# Patient Record
Sex: MALE | URBAN - METROPOLITAN AREA
[De-identification: names, ages, dates, MRNs, and addresses within clinical notes are randomized per-mention and may not be internally consistent; named-entity substitution may affect disease eponyms.]

---

## 2019-03-20 ENCOUNTER — OFFICE VISIT (OUTPATIENT)
Dept: URBAN - METROPOLITAN AREA CLINIC 23 | Facility: CLINIC | Age: 79
End: 2019-03-20
Payer: COMMERCIAL

## 2019-03-20 DIAGNOSIS — H52.13 MYOPIA, BILATERAL: Primary | ICD-10-CM

## 2019-03-20 PROCEDURE — 92015 DETERMINE REFRACTIVE STATE: CPT | Performed by: OPTOMETRIST

## 2019-03-20 PROCEDURE — 92012 INTRM OPH EXAM EST PATIENT: CPT | Performed by: OPTOMETRIST

## 2019-03-20 ASSESSMENT — KERATOMETRY
OD: 44.00
OS: 43.88

## 2019-03-20 ASSESSMENT — VISUAL ACUITY
OD: 20/25
OS: 20/25

## 2019-03-20 ASSESSMENT — INTRAOCULAR PRESSURE
OD: 18
OS: 19

## 2019-03-20 NOTE — IMPRESSION/PLAN
Impression: Myopia, bilateral: H52.13. Plan: SRx and CLs prescription released to patient. Discussed risks and benefits of CLs. Current glasses are okay to continue with. RTC at pts convenience for dilation.